# Patient Record
Sex: FEMALE | Race: OTHER | Employment: UNEMPLOYED | URBAN - METROPOLITAN AREA
[De-identification: names, ages, dates, MRNs, and addresses within clinical notes are randomized per-mention and may not be internally consistent; named-entity substitution may affect disease eponyms.]

---

## 2017-02-22 ENCOUNTER — ALLSCRIPTS OFFICE VISIT (OUTPATIENT)
Dept: OTHER | Facility: OTHER | Age: 37
End: 2017-02-22

## 2017-04-27 ENCOUNTER — APPOINTMENT (EMERGENCY)
Dept: ULTRASOUND IMAGING | Facility: HOSPITAL | Age: 37
End: 2017-04-27
Payer: COMMERCIAL

## 2017-04-27 ENCOUNTER — HOSPITAL ENCOUNTER (EMERGENCY)
Facility: HOSPITAL | Age: 37
Discharge: HOME | End: 2017-04-27
Attending: EMERGENCY MEDICINE | Admitting: EMERGENCY MEDICINE
Payer: COMMERCIAL

## 2017-04-27 VITALS
OXYGEN SATURATION: 98 % | SYSTOLIC BLOOD PRESSURE: 102 MMHG | DIASTOLIC BLOOD PRESSURE: 54 MMHG | HEART RATE: 77 BPM | BODY MASS INDEX: 23.71 KG/M2 | RESPIRATION RATE: 18 BRPM | WEIGHT: 98.33 LBS | TEMPERATURE: 98.5 F

## 2017-04-27 DIAGNOSIS — R19.7 NAUSEA, VOMITING, AND DIARRHEA: ICD-10-CM

## 2017-04-27 DIAGNOSIS — R11.2 NAUSEA, VOMITING, AND DIARRHEA: ICD-10-CM

## 2017-04-27 DIAGNOSIS — R10.84 GENERALIZED ABDOMINAL CRAMPS: Primary | ICD-10-CM

## 2017-04-27 LAB
ALBUMIN SERPL BCP-MCNC: 3.7 G/DL (ref 3.5–5)
ALP SERPL-CCNC: 95 U/L (ref 46–116)
ALT SERPL W P-5'-P-CCNC: 23 U/L (ref 12–78)
ANION GAP SERPL CALCULATED.3IONS-SCNC: 11 MMOL/L (ref 4–13)
AST SERPL W P-5'-P-CCNC: 21 U/L (ref 5–45)
BACTERIA UR QL AUTO: ABNORMAL /HPF
BASOPHILS # BLD AUTO: 0.02 THOUSANDS/ΜL (ref 0–0.1)
BASOPHILS NFR BLD AUTO: 0 % (ref 0–1)
BILIRUB SERPL-MCNC: 0.41 MG/DL (ref 0.2–1)
BILIRUB UR QL STRIP: ABNORMAL
BUN SERPL-MCNC: 12 MG/DL (ref 5–25)
CALCIUM SERPL-MCNC: 8.6 MG/DL (ref 8.3–10.1)
CHLORIDE SERPL-SCNC: 104 MMOL/L (ref 100–108)
CLARITY UR: ABNORMAL
CO2 SERPL-SCNC: 24 MMOL/L (ref 21–32)
COLOR UR: YELLOW
CREAT SERPL-MCNC: 0.62 MG/DL (ref 0.6–1.3)
EOSINOPHIL # BLD AUTO: 0.71 THOUSAND/ΜL (ref 0–0.61)
EOSINOPHIL NFR BLD AUTO: 6 % (ref 0–6)
ERYTHROCYTE [DISTWIDTH] IN BLOOD BY AUTOMATED COUNT: 13.1 % (ref 11.6–15.1)
GFR SERPL CREATININE-BSD FRML MDRD: >60 ML/MIN/1.73SQ M
GLUCOSE SERPL-MCNC: 86 MG/DL (ref 65–140)
GLUCOSE UR STRIP-MCNC: NEGATIVE MG/DL
HCG UR QL: NORMAL
HCT VFR BLD AUTO: 40.3 % (ref 34.8–46.1)
HGB BLD-MCNC: 13.3 G/DL (ref 11.5–15.4)
HGB UR QL STRIP.AUTO: ABNORMAL
KETONES UR STRIP-MCNC: ABNORMAL MG/DL
LEUKOCYTE ESTERASE UR QL STRIP: NEGATIVE
LIPASE SERPL-CCNC: 163 U/L (ref 73–393)
LYMPHOCYTES # BLD AUTO: 3.94 THOUSANDS/ΜL (ref 0.6–4.47)
LYMPHOCYTES NFR BLD AUTO: 36 % (ref 14–44)
MCH RBC QN AUTO: 26.2 PG (ref 26.8–34.3)
MCHC RBC AUTO-ENTMCNC: 33 G/DL (ref 31.4–37.4)
MCV RBC AUTO: 80 FL (ref 82–98)
MONOCYTES # BLD AUTO: 0.6 THOUSAND/ΜL (ref 0.17–1.22)
MONOCYTES NFR BLD AUTO: 5 % (ref 4–12)
NEUTROPHILS # BLD AUTO: 5.77 THOUSANDS/ΜL (ref 1.85–7.62)
NEUTS SEG NFR BLD AUTO: 53 % (ref 43–75)
NITRITE UR QL STRIP: NEGATIVE
NON-SQ EPI CELLS URNS QL MICRO: ABNORMAL /HPF
NRBC BLD AUTO-RTO: 0 /100 WBCS
PH UR STRIP.AUTO: 6 [PH] (ref 4.5–8)
PLATELET # BLD AUTO: 312 THOUSANDS/UL (ref 149–390)
PMV BLD AUTO: 10 FL (ref 8.9–12.7)
POTASSIUM SERPL-SCNC: 3.8 MMOL/L (ref 3.5–5.3)
PROT SERPL-MCNC: 7.8 G/DL (ref 6.4–8.2)
PROT UR STRIP-MCNC: ABNORMAL MG/DL
RBC # BLD AUTO: 5.07 MILLION/UL (ref 3.81–5.12)
RBC #/AREA URNS AUTO: ABNORMAL /HPF
SODIUM SERPL-SCNC: 139 MMOL/L (ref 136–145)
SP GR UR STRIP.AUTO: >=1.03 (ref 1–1.03)
UROBILINOGEN UR QL STRIP.AUTO: 0.2 E.U./DL
WBC # BLD AUTO: 11.04 THOUSAND/UL (ref 4.31–10.16)
WBC #/AREA URNS AUTO: ABNORMAL /HPF

## 2017-04-27 PROCEDURE — 81002 URINALYSIS NONAUTO W/O SCOPE: CPT | Performed by: EMERGENCY MEDICINE

## 2017-04-27 PROCEDURE — 99284 EMERGENCY DEPT VISIT MOD MDM: CPT

## 2017-04-27 PROCEDURE — 85025 COMPLETE CBC W/AUTO DIFF WBC: CPT | Performed by: EMERGENCY MEDICINE

## 2017-04-27 PROCEDURE — 96361 HYDRATE IV INFUSION ADD-ON: CPT

## 2017-04-27 PROCEDURE — 96375 TX/PRO/DX INJ NEW DRUG ADDON: CPT

## 2017-04-27 PROCEDURE — 36415 COLL VENOUS BLD VENIPUNCTURE: CPT | Performed by: EMERGENCY MEDICINE

## 2017-04-27 PROCEDURE — 76705 ECHO EXAM OF ABDOMEN: CPT

## 2017-04-27 PROCEDURE — 81025 URINE PREGNANCY TEST: CPT | Performed by: EMERGENCY MEDICINE

## 2017-04-27 PROCEDURE — 87086 URINE CULTURE/COLONY COUNT: CPT

## 2017-04-27 PROCEDURE — 96374 THER/PROPH/DIAG INJ IV PUSH: CPT

## 2017-04-27 PROCEDURE — 83690 ASSAY OF LIPASE: CPT | Performed by: EMERGENCY MEDICINE

## 2017-04-27 PROCEDURE — 80053 COMPREHEN METABOLIC PANEL: CPT | Performed by: EMERGENCY MEDICINE

## 2017-04-27 PROCEDURE — 81001 URINALYSIS AUTO W/SCOPE: CPT

## 2017-04-27 RX ORDER — ONDANSETRON 4 MG/1
4 TABLET, FILM COATED ORAL EVERY 6 HOURS
Qty: 4 TABLET | Refills: 0 | Status: SHIPPED | OUTPATIENT
Start: 2017-04-27

## 2017-04-27 RX ORDER — ONDANSETRON 2 MG/ML
4 INJECTION INTRAMUSCULAR; INTRAVENOUS ONCE
Status: COMPLETED | OUTPATIENT
Start: 2017-04-27 | End: 2017-04-27

## 2017-04-27 RX ORDER — NAPROXEN 500 MG/1
500 TABLET ORAL 2 TIMES DAILY WITH MEALS
Qty: 6 TABLET | Refills: 0 | Status: SHIPPED | OUTPATIENT
Start: 2017-04-27 | End: 2017-04-30

## 2017-04-27 RX ORDER — KETOROLAC TROMETHAMINE 30 MG/ML
15 INJECTION, SOLUTION INTRAMUSCULAR; INTRAVENOUS ONCE
Status: COMPLETED | OUTPATIENT
Start: 2017-04-27 | End: 2017-04-27

## 2017-04-27 RX ADMIN — SODIUM CHLORIDE 1000 ML: 0.9 INJECTION, SOLUTION INTRAVENOUS at 11:34

## 2017-04-27 RX ADMIN — KETOROLAC TROMETHAMINE 15 MG: 30 INJECTION, SOLUTION INTRAMUSCULAR at 11:35

## 2017-04-27 RX ADMIN — ONDANSETRON 4 MG: 2 INJECTION INTRAMUSCULAR; INTRAVENOUS at 11:34

## 2017-04-27 RX ADMIN — SODIUM CHLORIDE 1000 ML: 0.9 INJECTION, SOLUTION INTRAVENOUS at 13:30

## 2017-04-28 LAB — BACTERIA UR CULT: NORMAL

## 2018-01-10 NOTE — PROGRESS NOTES
2016         RE: Stephany Mayen                           To: Magnolia Enamorado, M D    MR#: [id number]                                    [address] [facility]  [address]   : [address]   ENC: [id number]                             Fax: [phone]   (Exam #: [id number])      The LMP of this 31year old,  2, para 1 patient was OCT 17 2015,   giving her an GARRY of 2016 and a current gestational age of 17 weeks   2 days by dates  A sonographic examination was performed on 2016   using real time equipment  The ultrasound examination was performed using   abdominal technique  The patient has a BMI of 24 6  Her blood pressure   today was 93/61  Earliest ultrasound found in her record: 12/15/15  8w0d  16 GARRY      Patient is a  2 para 1 who delivered via  section at term   for failed induction of labor  Patient reports her baby weighed 2980 g in   the delivery occurred 09/14/10  She reports today she is on prenatal   vitamins daily and Diclegis as needed for hyperemesis  She denies any   allergies to medication  She denies any use of cigarettes, alcohol or   illicit drugs  She denies any prior past medical history or significant   first generation family history of hypertension, diabetes, thrombosis or   congenital anomalies  Her only surgery is her prior   She would   like to  in this pregnancy  She has received a flu shot in this   pregnancy  She has not completed her prenatal labs yet  Multiple longitudinal and transverse sections revealed a zepeda   intrauterine pregnancy with the fetus in variable presentation  The   placenta is anterior in implantation, grade 0 in appearance        Cardiac motion was observed at 159 bpm       INDICATIONS      first trimester genetic screening      Exam Types      Level I      RESULTS      Fetus # 1 of 1   Variable presentation MEASUREMENTS (* Included In Average GA)      CRL              7 0 cm        13 weeks 0 days*   Nuchal Trans    1 50 mm      THE AVERAGE GESTATIONAL AGE is 13 weeks 0 days +/- 7 days  UTERINE ARTERIES                                  S/D   PI    RI    NOTCH       Right Uterine Artery       2 90  1 20  0 65      ANATOMY COMMENTS      Anatomic detail is limited at this gestational age  The yolk sac was not   noted  The fetal cranium appeared normal in shape and the nuchal   translucency was normal in size (1 5mm)  The nasal bone appears to be   present  The intracranial anatomy was unremarkable  Evaluation of the   spine revealed no obvious evidence for a neural tube defect  Anatomy of   the fetal thorax appeared within normal limits  The cardiac rhythm was   regular  Within the abdomen, stomach & bladder were visualized and the   abdominal wall appeared intact  A three vessel cord appears to be present  Active movement of the fetal body & extremities was seen  There is no   suspicion of a subchorionic bleed  The placental cord insertion was   normal    The uterine artery Dopplers are normal for this gestational age  There is no suspicion of a uterine myoma  Free fluid is not seen in the   posterior cul-de-sac  ADNEXA      The left ovary was not visualized  The right ovary appeared normal and   measured 2 6 x 2 4 x 1 7 cm with a volume of 5 5 cc  AMNIOTIC FLUID      Largest Vertical Pocket = 2 5 cm   Amniotic Fluid: Normal      IMPRESSION      [place] I[facility]   13 weeks and 0 days by this ultrasound  (GARRY=JUL 25 2016)   Variable presentation   Regular fetal heart rate of 159 bpm   Anterior placenta      GENERAL COMMENT      As per your request, a consultation was performed on your patient for the   following indication: sequential screen and history of a CS        The patient was informed of the findings and counseled about the   limitations of the exam in detecting all forms of fetal congenital   abnormalities  She denies any vaginal bleeding or uterine cramping/contractions  Exam shows she is comfortable and her abdomen is non tender  Today's ultrasound findings and suggested follow-up were discussed in   detail with the patient  The Sequential Screen was discussed in detail,   including the sensitivities for detection of Down syndrome, Trisomy 18,   and open neural tube defects  The patient underwent fingerstick blood   collection for hCG and ZARINA-A to complete the initial component of the   Sequential Screen  Results should be available within one week  Follow up recommended:   1  Follow-up multiple marker serum screening at 16-18 weeks' gestation is   recommended to complete the Sequential Screen  2  Fetal Level II ultrasound imaging is recommended at 19-20 weeks'   gestation  3  Patient and her  would like a  and asked if I thought this   was a good idea  Reconfirmed that the patient is aware of the risks and   benefits of a vaginal delivery after having had a previous    section in a prior pregnancy  She voiced understanding that a trial of   labor or a elective CS is her choice  Discussed the advantages of a   successful vaginal delivery include decreased risks for hemorrhage   (bleeding) and infection, a shorter hospital stay after delivery, and a   less painful more rapid recovery  The risks include uterine rupture   (opening of the prior incision on the uterus), which can occur in about 1%   of 's  In the event of uterine rupture there is a risk of maternal   injury that may require a transfusion, removal of the uterus or damage to   nearby organs like bladder or bowel  In the event of a uterine rupture   there is also a 10-25% risk of significant adverse fetal sequelae     Although catastrophic rupture leading to  death or permanent   injury to the  is rare, occurring less often then 1000 's, it   does occur and may occur no matter what the resources are available to   deal with it  Such risks increase with more then one uterine surgery, use   of cervical ripening or induction agents, a large baby, closely spaced   gestations < 18 months and augmentation of labor with agents to increase   contractions like oxytocin  BECKY Heredia M D     Maternal-Fetal Medicine   Electronically signed 01/18/16 21:01

## 2018-01-10 NOTE — RESULT NOTES
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     Interpretation: DNA testing indicates that this patient is  positive for the -alpha3 7 alpha-globin deletion on one  chromosome  This deletion removes one of the alpha-globin  genes from the alpha-globin gene cluster  Therefore, this  patient is at least a carrier of an alpha(plus)-thalassemia  mutation (genotype -alpha/alphaalpha)  Individuals with this genotype are usually clinically  normal  If this patient is symptomatic, he or she may have  an additional, rare alpha-thalassemia mutation  If the  partner of this patient is a carrier of  alpha(zero)-thalassemia, this couple is at-risk of having a  child affected by Hemoglobin H disease  Family studies may  be indicated  Genetic counseling is recommended  Laboratory  results and submitted clinical information reviewed by  Brooke Ballard MD, CORRIE, Oz Peguero  Alpha-globin is an essential component of the hemoglobin  tetramer, starting from the early stages of embryonic  development  Deletion mutations involving one or both of the  two alpha-globin genes (alpha1 and alpha2, located on  chromosome 16p13) lead to reduced production of alpha-globin  chains, and are the major cause of alpha-thalassemia  Severity of the disease is dependent on the total copy  number of functional alpha-globin genes remaining  This assay detects the seven most common deletions  (-alpha3 7, -alpha4 2, -alpha20 5, --SEA, --MED, -CHRISTINE, and  --RAMÓN) found in patients with alpha-thalassemia  This assay  is performed by allele-specific PCR amplification of  deletion mutation fragments, followed by agarose gel  electrophoresis of the amplification products   It is not  known what percentage of individuals with alpha-globin gene  deletions will be detected by this test      For assistance with the interpretation of those results,  please contact your local [facility] genetic  counselor or call [phone])  This test is performed pursuant to a license agreement with  [address] [place] [address]      This test was developed and its analytical performance  characteristics have been determined by [employer]  It has not been  cleared or approved by FDA  This assay has been validated  pursuant to the CLIA regulations and is used for clinical  purposes

## 2018-01-11 NOTE — RESULT NOTES
Verified Results  (1) SEQUENTIAL SCREEN 2 [id number] 11:19AM Erick Shah     Test Name Result Flag Reference   SCAN RESULT      Results available in the [facility]

## 2018-01-11 NOTE — MISCELLANEOUS
Message  Message Free Text Note Form: 7/29/16 1305: pt noting contractions lasting 15-30 seconds  TOLAC labor precautions reviewed  Call prn  BEO  7/30/16 0215: Pt contraction steadily worsening, longer and Q 5 minutes   Advised pt to proceed to L&D for eval BEO      Signatures   Electronically signed by : MELISSA Jack ; Aug  1 2016  7:16PM EST                       (Author)

## 2018-01-12 VITALS
WEIGHT: 109 LBS | DIASTOLIC BLOOD PRESSURE: 62 MMHG | BODY MASS INDEX: 25.22 KG/M2 | SYSTOLIC BLOOD PRESSURE: 100 MMHG | HEIGHT: 55 IN

## 2018-01-12 NOTE — RESULT NOTES
Verified Results  (1) OP PARAG FERRITIN [id number] 01:30PM Renato Reyez   REPORT COMMENT:  FASTING:NO     Test Name Result Flag Reference   FERRITIN 10 ng/mL       (1) IRON [id number] 01:30PM Renato Reyez     Test Name Result Flag Reference   IRON, TOTAL 72 mcg/dL         Plan  Microcytosis    · (Q) ALPHA GLOBIN COMMON MUTATION ANALYSIS; Status:Active;  Requested  for:[id number];

## 2018-01-13 NOTE — PROGRESS NOTES
Chief Complaint  PT presents to office for Tdap injection  PT is 31 weeks 6 days  Given in right deltoid  Lot number- [id number]  Expiration- 18  NDC- [id number]      Active Problems    1  Anemia complicating pregnancy, third trimester (648 23,285 9) (O99 013)   2  Maternal thalassemia complicating pregnancy, third trimester (648 23,282 40)   (O99 013,D56 9)   3  Nausea/vomiting in pregnancy (643 90) (O21 9)   4  Previous  section complicating pregnancy (249 16) (O34 21)   5  Thalassemia, alpha (282 43) (D56 0)    Current Meds   1  No Reported Medications Recorded    Allergies    1  No Known Drug Allergies    2  No Known Environmental Allergies   3  No Known Food Allergies    Assessment    1  Need for prophylactic vaccination and inoculation against influenza (V04 81) (Z23)    Plan  Health Maintenance    · Tdap (Adacel)    Future Appointments    Date/Time Provider Specialty Site   2016 10:30 AM MELISSA Triana  Obstetrics/Gynecology [address] OB/GYN   2016 10:30 AM MELISSA Triana  Obstetrics/Gynecology [address] [place] [address] OB/GYN   07/15/2016 10:30 AM MELISSA Triana  Obstetrics/Gynecology [address] [place] [address] OB/GYN   2016 10:30 AM MELISSA Rosales  Obstetrics/Gynecology [address] OB/GYN   2016 10:30 AM MELISSA Rosales  Obstetrics/Gynecology [address] [place] [address] OB/GYN   2016 10:30 AM MELISSA Rosales   Obstetrics/Gynecology [address] [place] [address] OB/GYN     Signatures   Electronically signed by : MELISSA Marinelli ; May 27 2016  1:08PM EST                       (Validation)

## 2018-01-15 NOTE — RESULT NOTES
Verified Results  (1) URINE CULTURE 25Mar2016 12:00AM Nigel George     Test Name Result Flag Reference   CULTURE, URINE, ROUTINE      CULTURE, URINE, ROUTINE         MICRO NUMBER:      [id number]    TEST STATUS:       FINAL    SPECIMEN SOURCE:   URINE    SPECIMEN QUALITY:  ADEQUATE    RESULT:            No Growth

## 2018-01-15 NOTE — RESULT NOTES
Verified Results  (1) OP PARAG FERRITIN [id number] 01:30PM Renato Reyez   REPORT COMMENT:  FASTING:NO     Test Name Result Flag Reference   FERRITIN 10 ng/mL         Plan  Microcytosis    · (Q) ALPHA GLOBIN COMMON MUTATION ANALYSIS; Status:Active;  Requested  for:[id number];

## 2018-01-15 NOTE — RESULT NOTES
Verified Results  (1) TISSUE EXAM OB (PLACENTA) ONLY [id number] 04:03AM Barrera Vences     Test Name Result Flag Reference   LAB AP CASE REPORT (Report)     Surgical Pathology Report             Case: [id number]                   Authorizing Provider: Brittani Negrete MD    Collected:      07/30/2016 0403        Ordering Location:   [facility]    Received:      07/30/2016 [address] and                                          Delivery                                    Pathologist:      Cleopatra Camara MD                           Specimen:  Placenta   LAB AP FINAL DIAGNOSIS (Report)     A  Placenta:    - Intact placenta with centrally inserted, three vessel umbilical cord  - Changes of meconium passage, including piled up, vacuolated amniotic   epithelium with subamniotic    connective tissue matrix with edema and pigmented macrophages  - Mild acute umbilical phlebitis and mild chorionic vasculitis (Stage 1   - early fetal inflammatory response;     grade 1 of 2, mild intensity)    - Mild acute subchorionitis/early chorionitis (Stage 1 - early maternal   inflammatory response;     grade 1 of 2, mild intensity)   - Placental size and weight (437 grams) are <10th percentile for a 41   week gestation,    with age-appropriate villous morphology  - Parenchyma demonstrates focal ischemic changes, including agglutinated   and     sclerosed villi, increased syncytial knots, intervillous fibrinoid   material with calcifications,    (less than 5% disc volume)   - No evidence of neoplasia  Note: Findings discussed by Dr Lopez Cheese [facility]),   8/5/16  Electronically signed by Cleopatra Camara MD on 8/5/2016 at 10:57 AM   LAB AP SURGICAL ADDITIONAL INFORMATION (Report)     These tests were developed and their performance characteristics   determined by [facility] or [employer]   They may not be cleared or approved by the [place]  Food and   Drug Administration  The FDA has determined that such clearance or   approval is not necessary  These tests are used for clinical purposes  They should not be regarded as investigational or for research  This   laboratory has been approved by CLIA 88, designated as a high-complexity   laboratory and is qualified to perform these tests  LAB AP GROSS DESCRIPTION (Report)     A  The specimen is received in formalin, labeled with the patient's name   and hospital number, and is designated placenta  The specimen consists   of an intact placenta with attached membranes and umbilical cord  The   umbilical cord is centrally inserted, measuring 32 cm in length by 1 2 cm   in average diameter which contains 3 vessels  The fetal membranes show   marginal insertion and are thin, focally slimy, focally discolored   slightly green  Following removal of the umbilical cord and membranes, the   placental disc weighs 437 g  The placental disc measures 17 x 13 x 2 6 cm  The maternal surface exhibits loosely attached blood clot and   calcifications, both involving less than 5% of the disc  Upon cut section   no discrete gross abnormalities are identified within the disc itself  Representative sections  Four cassettes  Sections are submitted as   follows:    1: Umbilical cord  2: Fetal membranes  3-4: Body of placenta, representative sections    Note: The estimated total formalin fixation time based upon information   provided by the submitting clinician and the standard processing schedule   is over 72 hours      MAC   LAB AP NOTE      Interpretation performed at [facility], [address] [place] [address]

## 2018-01-16 NOTE — PROGRESS NOTES
MAR 8 2016         RE: Yasmin Waters                           To: MELISSA Soliman    MR#: [id number]                                    [address] [facility]  [address]   : [address]   ENC: [id number]                             Fax: [phone]   (Exam #: [id number])      The LMP of this 31year old,  2, para 1 patient was OCT 17 2015,   giving her an GARRY of 2016 and a current gestational age of 25 weeks   3 days by dates  A sonographic examination was performed on MAR 8 2016   using real time equipment  The ultrasound examination was performed using   abdominal & vaginal techniques  The patient has a BMI of 26 3  Her blood   pressure today was 93/64  Earliest ultrasound found in her record: 12/15/15  8w0d  16 GARRY      Olegario Davison has no complaints today  She reports fetal movement and denies   vaginal bleeding  She has not yet gone to the lab for the second trimester   component of the Sequential Screen        Cardiac motion was observed at 136 bpm       INDICATIONS      fetal anatomical survey      Exam Types      Transvaginal   LEVEL II      RESULTS      Fetus # 1 of 1   Breech presentation   Fetal growth appeared normal   Placenta Location = Anterior   No placenta previa   Placenta Grade = I      MEASUREMENTS (* Included In Average GA)      AC              14 2 cm        19 weeks 1 day * (28%)   BPD              4 6 cm        19 weeks 6 days* (36%)   HC              16 8 cm        19 weeks 3 days* (20%)   Femur            3 1 cm        19 weeks 5 days* (27%)      Nuchal Fold      3 8 mm      Humerus          2 9 cm        19 weeks 4 days  (32%)   Radius           2 8 cm        21 weeks 4 days   Ulna             3 0 cm        21 weeks 1 day   Tibia            2 7 cm        19 weeks 6 days  (22%)   Fibula           2 7 cm        18 weeks 6 days   Foot             3 6 cm        21 weeks 1 day Cerebellum       2 0 cm        19 weeks 5 days   Biorbit          3 1 cm        19 weeks 6 days   CisternaMagna    5 6 mm      HC/AC           1 19   FL/AC           0 22   FL/BPD          0 67   EFW (Ac/Fl/Hc)   296 grams - 0 lbs 10 oz      THE AVERAGE GESTATIONAL AGE is 19 weeks 4 days +/- 10 days  AMNIOTIC FLUID      Largest Vertical Pocket = 5 4 cm   Amniotic Fluid: Normal      ANATOMY SUMMARY      The fetal cranium appeared normal in shape  Choroid plexus cysts are not   present  The lateral ventricles were not dilated and the midline   structures were not deviated  The cerebellum and cisterna magna were   visualized and appeared normal  The nuchal fold is not abnormally   thickened, measured at 3 8 mm  The calvarium showed no evidence of defect,   scalloping of the parietal bones or abnormal shape  The cavum septum   pellucidum appears normal  The fetal face appears normal  There is no   evidence of facial clefting, hypotelorism, hypertelorism or micrognathia  A normal appearing nasal bone measuring 6 1 mm was identified in the   sagittal profile view  Anatomy of the fetal thorax appeared within   normal limits  The fetal diaphragm appears intact  There were no   intrathoracic masses noted or evidence of pleural/pericardial effusion  The cardiac arch views appeared normal   There was no suspicion of   tricuspid regurgitation  The cardiac size and structures appeared   sonographically normal at the four chamber view, and cardiac rhythm was   regular  There is no suspicion of fetal dysrhythmia  There was no   evidence of cardiomegaly, pericardial effusion or atrial/ventricular   disproportion  Two atrioventricular valves were noted with normal inflow,   confirmed with color Doppler imaging  Ventriculoarterial connections are   appropriate and normal short axis of the great vessels is demonstrated  The interventricular septum appeared to be intact    There is no suspicion   of an echogenic intracardiac focus  The three vessel  tracheal view   appears normal   The outflow tract views are normal  The abdominal cavity   appears normal  There is no evidence of fetal bowel obstruction or   abnormally echogenic bowel  Ascites is not present  The fetal stomach   appears normal in size and shape  The right kidney appears normal  There   is no suspicion of pyelectasis  Renal cysts are not present  The   echogenicity of the kidney is normal  The left kidney appears normal     There is no suspicion of pyelectasis  The echogenicity of the kidney is   normal   renal cysts are not present  The fetal bladder appears normal in   size and shape  There is no suspicion of ureterocele  The abdominal wall   appears intact  A normal abdominal cord insertion is noted  The spine was   visualized from cervical to sacral region, within the resolution of the   ultrasound equipment, without evidence of a neural tube defect  or other   malformation  Active movement of the extremities was seen and fetal body   motion was also observed during this examination  There was no evidence of   long bone abnormality and the extremities were followed to their distal   extent  There was no evidence of club foot or rocker bottom deformity  There was no evidence of abnormal hand posturing noted  The genitalia   appeared normal  The placenta appears normal  There is no evidence of   advanced placental maturation, placental abruption, intervillous   thrombosis, placental infarction or multiple venous lakes  There is a 3   vessel cord with normal insertion site  A normal amount of umbilical   vascular coiling is noted  The uterine contour appears normal  There is no   suspicion of a uterine myoma  ADNEXA      The left ovary appeared normal and measured 3 2 x 2 2 x 1 3 cm with a   volume of 4 8 cc  The right ovary appeared normal and measured 4 0 x 2 5 x   1 5 cm with a volume of 7 8 cc        CERVICAL EVALUATION      The cervix appeared normal            Supine               Cervical Length: 3 50 cm        Other Test Results         Resp To T F  Pressure: No                    Funneling?: No              Dynamic Changes?: No      IMPRESSION      [place] IUP   19 weeks and 4 days by this ultrasound  (GARRY=JUL 29 2016)   Breech presentation   Fetal growth appeared normal   Normal anatomy survey   Regular fetal heart rate of 136 bpm   Anterior placenta   No placenta previa      GENERAL COMMENT      No fetal structural abnormality or ultrasound marker for aneuploidy is   identified on the Level II ultrasound study today  Fetal growth and   amniotic fluid volume are normal   The placenta is normal in appearance  The cervix is normal in appearance by transvaginal sonography  The   cervical length is normal   Cervical debris is not present  Cervical   funneling is not present  Neither provocative nor dynamic change is   appreciated  Today's ultrasound findings and suggested follow-up were discussed in   detail with Pallavi and her   We discussed that prenatal ultrasound   cannot rule out all congenital abnormalities  We gave Yissel Yu another lab   requisition for the second trimester component of the Sequential Screen,   which she intends to have drawn today  No further appointment has been   scheduled in the [facility]  for Yissel Yu at this time  Follow-up M   ultrasound evaluation is recommended as clinically indicated  The face to face time, in addition to time spent discussing ultrasound   results, was 10 minutes, greater than 50% of which was spent during   counseling and coordination of care  BECKY Chiang M D     Maternal-Fetal Medicine   Electronically signed 03/08/16 11:18

## 2018-01-17 NOTE — MISCELLANEOUS
Message  Message Free Text Note Form: The patient paged over the lunch hour to report that she feels dizzy and that she is short of breath  She reports she is resting but still feels dizzy  The pt is speaking complete sentences without difficulty  She reports +FM  Denies vb, lof, ctx  PT advised to rest and hydrate and will check on her in 1 hour to see if there is improvement  Upon calling the patient back 1 hour later, she reports no improvement -- advised to go to L&D for evaluation  Pt agreeable with plan of care        Signatures   Electronically signed by : MELISSA Hull ; Jul 12 2016  4:49PM EST                       (Author)

## 2018-01-17 NOTE — RESULT NOTES
Verified Results  (Q) CYSTIC FIBROSIS SCREEN [id number] 11:15AM Refugio Santos     Test Name Result Flag Reference   REPORTED ETHNICITY N/P     CF RESULT see note     NEGATIVE; NONE OF THE MUTATIONS LISTED  BELOW WERE DETECTED   INTERPRETATION see note     This result does not rule out the presence of a  mutation or a diagnosis of cystic fibrosis disease  (CF)  The risk for mutations that cause CF other  than the ones tested depends greatly on family  history, clinical presentation, and ethnicity                                  Chance of Having a CF Mutation  Ethnic Group    Detection   Before      After Negative                     Rate       Test           Result  Ashkenazi Latter day   94%        1 in 24       1 in 400  Non-       88%        1 in 25       1 in 208    -American  72%        1 in 46       1 in 164  -American   65%        1 in 65       1 in 186  -American     49%        1 in 94       1 in 184  Other                   insufficient data available   MUTATIONS/POLYMORPHISMS see note     G85E (c 254G>A)              R334W (c 1000C>T)  394delTT (c 262delTT)            R347H (c 1040G>A)  R117H (c 350G>A)              R347P (c 1040G>C)  621+1 G>T (c 489+1G>T)            A455E (c 1364C>A)  711+1 G>T (c 579+1G>T)          1507del (c 1519delATC)  1078delT (c 948delT)           B631vxc (c 1521delCTT)  V520F (c 1558G>T)             R553X (c 1657C>T)  1717-1 G>A (c 1585-1G>A)           R560T (c 1679G>C)  G542X (c 1624G>T)        1898+1 G>A (c 1766+1G>A)  S549N (c 1646G>A)       2183AA>G (c 2051delAAinsG)  S549R (c 1645A>C or c 1647T>G)  2184delA (c 2052delA)  G551D (c 1652G>A)        2789+5 G>A (c 2657+5G>A)  3120+1 G>A (c 2988+1G>A)          N2618A (c 3846G>A)  X8759U (c 3484C>T)            J3670Z (c 3909C>G)  3659delC (c 3528delC)  3849+10kb C>T (C 9517+43700K>T)  3876delA (c 3744delA)  3905insT (c 3773insT)     This assay detects thirty-two mutations, including the  twenty-three core mutations recommended by the [address] [employer]) and the [facility] [employer] of Obstetricians and Gynecologists (ACOG) for  population-based CF carrier screening  In addition  to the ACMG/ACOG panel, this assay detects nine  additional mutations  While these mutations are rare  in the  population, the scientific and medical  literature indicates that these mutations are not  benign polymorphisms  The status of the intron 5  (formerly intron 8) polyT tract is reported only  when the R117H mutation is detected  METHOD see note     The mutations are detected by multiplex-polymerase  chain reaction (PCR) amplification of specific CF  gene regions, followed by nucleotide sequence analysis  on a massively parallel sequencing platform  Although  rare, false positive or false negative results may  occur  All results should be interpreted in the context  of clinical findings, relevant history, and other  laboratory data  REVIEWER see note     Aixa Tiwari, Ph D ,[facility]  Director, [facility] care providers, please contact your local [address] [place] [address] genetic counselor or call [employer]  ([phone]) for assistance with interpretation of  these results  The analytical performance characteristics of this  assay have been determined by [employer], [place]  The modifications  have not been cleared or approved by the FDA  This  assay has been validated pursuant to the CLIA  regulations and is used for clinical purposes  For more information on this test, go to  http://[email or web address]/cfscreen     (Q) HEMOGLOBINOPATHY EVALUATION [id number] 11:15AM Korin Laundry     Test Name Result Flag Reference   RED BLOOD CELL COUNT 4 43 Million/uL  3 80-5 10   HEMOGLOBIN 11 4 g/dL L 11 7-15 5   HEMATOCRIT 34 9 % L 35 0-45 0   MCV 78 7 fL L 80 0-100 0   MCH 25 6 pg L 27 0-33 0   RDW 15 2 % H 11 0-15 0   HEMOGLOBIN A 96 8 %  >96 0   HEMOGLOBIN F <1 0 %  <2 0   HEMOGLOBIN A2 (QUANT) 2 2 %  1 8-3 5   INTERPRETATION      Evaluation reveals a normal hemoglobin phenotype with associated  microcytosis  If iron deficiency is ruled out, alpha thalassemia trait  should be considered  (Q) OBSTETRIC PANEL [id number] 11:15AM Tejas Boateng     Test Name Result Flag Reference   WHITE BLOOD CELL COUNT 10 0 Thousand/uL  3 8-10 8   RED BLOOD CELL COUNT 4 43 Million/uL  3 80-5 10   HEMOGLOBIN 11 4 g/dL L 11 7-15 5   HEMATOCRIT 34 9 % L 35 0-45 0   MCV 78 7 fL L 80 0-100 0   MCH 25 6 pg L 27 0-33 0   MCHC 32 6 g/dL  32 0-36 0   RDW 15 2 % H 11 0-15 0   PLATELET COUNT 294 Thousand/uL  140-400   MPV 9 4 fL  7 5-11 5   ABSOLUTE NEUTROPHILS 5710 cells/uL  9372-2225   ABSOLUTE LYMPHOCYTES 2950 cells/uL  850-3900   ABSOLUTE MONOCYTES 530 cells/uL  200-950   ABSOLUTE EOSINOPHILS 750 cells/uL H    ABSOLUTE BASOPHILS 60 cells/uL  0-200   NEUTROPHILS 57 1 %     LYMPHOCYTES 29 5 %     MONOCYTES 5 3 %     EOSINOPHILS 7 5 %     BASOPHILS 0 6 %     ANTIBODY SCREEN, RBC$W/REFL ID, TITER AND AG      NO ANTIBODIES DETECTED   Reference range                  No antibodies detected     This assay is a screening test for the detection of   red blood cell antibodies  The test is not to be used   for pretransfusion screening or for the medical   management of an alloimmunized pregnancy  ABO GROUP B     RH TYPE RH(D) POSITIVE     RPR (DX) W/REFL TITER AND$CONFIRMATORY TESTING NON-REACTIVE  NON-REACTIVE   HEPATITIS B SURFACE$ANTIGEN NON-REACTIVE  NON-REACTIVE   RUBELLA ANTIBODY (IGG) 3 58     Value            Interpretation           -----            --------------             < or = 0 90      Not consistent with Immunity           0 91-1 09        Equivocal           > or = 1 10      Consistent with Immunity      The presence of rubella IgG antibody suggests   immunization or past or current infection with  rubella virus  (Q) URINALYSIS MACROSCOPIC [id number] 11:15AM Lyndsey Sukhi     Test Name Result Flag Reference   COLOR YELLOW  YELLOW   APPEARANCE CLEAR  CLEAR   SPECIFIC GRAVITY 1 019  1 001-1 035   PH 6 0  5 0-8 0   GLUCOSE NEGATIVE  NEGATIVE   BILIRUBIN NEGATIVE  NEGATIVE   KETONES NEGATIVE  NEGATIVE   OCCULT BLOOD NEGATIVE  NEGATIVE   PROTEIN NEGATIVE  NEGATIVE   NITRITE NEGATIVE  NEGATIVE   LEUKOCYTE ESTERASE 2+ A NEGATIVE     (Q) URINALYSIS MICROSCOPIC [id number] 11:15AM Capricor Sukhi     Test Name Result Flag Reference   WBC > OR = 60 /HPF A < OR = 5   RBC 0-2 /HPF  < OR = 2   SQUAMOUS EPITHELIAL CELLS 6-10 /HPF A < OR = 5   BACTERIA MANY /HPF A NONE SEEN   HYALINE CAST NONE SEEN /LPF  NONE SEEN     (1) URINE CULTURE [id number] 11:15AM Capricor Sukhi   REPORT COMMENT:  FASTING:NO     Test Name Result Flag Reference   CULTURE, URINE, ROUTINE      CULTURE, URINE, ROUTINE         MICRO NUMBER:      [id number]    TEST STATUS:       FINAL    SPECIMEN SOURCE:   URINE    SPECIMEN QUALITY:  ADEQUATE    RESULT:            No Growth     (1) HIV AG/AB COMBO, 4TH GEN [id number] 11:15AM Viamericaser     Test Name Result Flag Reference   HIV AG/AB, 4TH GEN NON-REACTIVE  NON-REACTIVE   A Nonreactive HIV Ag/Ab result does not exclude  HIV infection since the time frame for seroconversion  is variable  If acute HIV infection is suspected,  a HIV-1 RNA Qualitative TMA test is recommended  PLEASE NOTE: This information has been disclosed to you  from records whose confidentiality may be protected by  state law  If your state requires such protection, then  the state law prohibits you from making any further  disclosure of the information without the specific  written consent of the person to whom it pertains, or  as otherwise permitted by law  A general authorization  for the release of medical or other information is NOT  sufficient for this purpose       The performance of this assay has not been clinically  validated in patients less than 3years old  For additional information please refer to  http://[employer]/faq/UBR046  (This link is being provided for informational/  educational purposes only )

## 2018-01-17 NOTE — RESULT NOTES
Verified Results  (1) TISSUE EXAM [id number] 03:53AM Renato Reyez     Test Name Result Flag Reference   LAB AP CASE REPORT (Report)     Surgical Pathology Report             Case: [id number]                   Authorizing Provider: Harlan Lopez MD    Collected:      07/30/2016 0353        Ordering Location:   [facility]    Received:      08/01/2016 [address] [place] [address] Labor and                                          Delivery                                    Pathologist:      Lang Franco MD                                 Specimens:  A) - Fallopian Tube, Right                                       B) - Fallopian Tube, Left   LAB AP FINAL DIAGNOSIS (Report)     A  Right fallopian tube (salpingectomy):    - Benign fallopian tube with no significant pathologic abnormality      - No atypia or malignancy identified  B  Left fallopian tube (salpingectomy):    - Benign fallopian tube with no significant pathologic abnormality      - No atypia or malignancy identified  Electronically signed by Lang Franco MD on 8/4/2016 at 2:35 PM   LAB AP NOTE      Interpretation performed at [facility], [address] [place] [address] Drive [address] [place] [address]  LAB AP SURGICAL ADDITIONAL INFORMATION (Report)     These tests were developed and their performance characteristics   determined by [facility] or [employer]  They may not be cleared or approved by the [place]  Food and   Drug Administration  The FDA has determined that such clearance or   approval is not necessary  These tests are used for clinical purposes  They should not be regarded as investigational or for research  This   laboratory has been approved by CLIA 88, designated as a high-complexity   laboratory and is qualified to perform these tests  LAB AP GROSS DESCRIPTION (Report)     A   The specimen is received in formalin, labeled with the patient's name   and hospital number, and is designated right fallopian tube  The   specimen consists of a pink to purple fallopian tube with fimbriated end,   which measures 4 9 cm in length and ranges from 0 4 cm to 0 5 cm in   diameter, and is unremarkable  Representative sections  One cassette  B  The specimen is received in formalin, labeled with the patient's name   and hospital number, and is designated left fallopian tube  The   specimen consists of a fallopian tube with fimbriated end, which measures   7 8 cm in length and ranges from 0 4 cm to 0 6 cm in diameter and is   unremarkable  Representative sections  One cassette  Note: The estimated total formalin fixation time based upon information   provided by the submitting clinician and the standard processing schedule   is over 72 hours    MAS   LAB AP CLINICAL INFORMATION None provided